# Patient Record
Sex: FEMALE | Race: WHITE | Employment: OTHER | URBAN - METROPOLITAN AREA
[De-identification: names, ages, dates, MRNs, and addresses within clinical notes are randomized per-mention and may not be internally consistent; named-entity substitution may affect disease eponyms.]

---

## 2019-02-23 ENCOUNTER — HOSPITAL ENCOUNTER (EMERGENCY)
Age: 59
Discharge: HOME | End: 2019-02-23
Payer: COMMERCIAL

## 2019-02-23 ENCOUNTER — APPOINTMENT (OUTPATIENT)
Dept: GENERAL RADIOLOGY | Age: 59
End: 2019-02-23
Payer: COMMERCIAL

## 2019-02-23 VITALS
HEART RATE: 75 BPM | OXYGEN SATURATION: 98 % | BODY MASS INDEX: 26.66 KG/M2 | SYSTOLIC BLOOD PRESSURE: 183 MMHG | DIASTOLIC BLOOD PRESSURE: 105 MMHG | HEIGHT: 65 IN | WEIGHT: 160 LBS | TEMPERATURE: 98.6 F | RESPIRATION RATE: 18 BRPM

## 2019-02-23 DIAGNOSIS — S86.911A STRAIN OF RIGHT KNEE, INITIAL ENCOUNTER: Primary | ICD-10-CM

## 2019-02-23 PROCEDURE — 99283 EMERGENCY DEPT VISIT LOW MDM: CPT

## 2019-02-23 PROCEDURE — 6370000000 HC RX 637 (ALT 250 FOR IP): Performed by: PHYSICIAN ASSISTANT

## 2019-02-23 PROCEDURE — 73562 X-RAY EXAM OF KNEE 3: CPT

## 2019-02-23 RX ORDER — NAPROXEN 500 MG/1
500 TABLET ORAL 2 TIMES DAILY
Qty: 14 TABLET | Refills: 0 | Status: SHIPPED | OUTPATIENT
Start: 2019-02-23 | End: 2020-10-02

## 2019-02-23 RX ORDER — IBUPROFEN 600 MG/1
600 TABLET ORAL ONCE
Status: COMPLETED | OUTPATIENT
Start: 2019-02-23 | End: 2019-02-23

## 2019-02-23 RX ADMIN — IBUPROFEN 600 MG: 600 TABLET ORAL at 14:36

## 2019-02-23 ASSESSMENT — PAIN DESCRIPTION - DESCRIPTORS: DESCRIPTORS: ACHING

## 2019-02-23 ASSESSMENT — PAIN SCALES - GENERAL
PAINLEVEL_OUTOF10: 5
PAINLEVEL_OUTOF10: 5

## 2019-02-23 ASSESSMENT — PAIN DESCRIPTION - LOCATION: LOCATION: KNEE

## 2019-02-23 ASSESSMENT — PAIN DESCRIPTION - PAIN TYPE: TYPE: ACUTE PAIN

## 2019-02-23 ASSESSMENT — PAIN DESCRIPTION - ORIENTATION: ORIENTATION: RIGHT

## 2019-02-23 ASSESSMENT — PAIN - FUNCTIONAL ASSESSMENT: PAIN_FUNCTIONAL_ASSESSMENT: PREVENTS OR INTERFERES WITH MANY ACTIVE NOT PASSIVE ACTIVITIES

## 2019-03-08 LAB
ALBUMIN SERPL-MCNC: NORMAL G/DL
ALP BLD-CCNC: NORMAL U/L
ALT SERPL-CCNC: NORMAL U/L
ANION GAP SERPL CALCULATED.3IONS-SCNC: NORMAL MMOL/L
AST SERPL-CCNC: NORMAL U/L
BASOPHILS ABSOLUTE: NORMAL
BASOPHILS RELATIVE PERCENT: NORMAL
BILIRUB SERPL-MCNC: NORMAL MG/DL
BUN BLDV-MCNC: NORMAL MG/DL
CALCIUM SERPL-MCNC: NORMAL MG/DL
CHLORIDE BLD-SCNC: NORMAL MMOL/L
CHOLESTEROL, TOTAL: NORMAL
CHOLESTEROL/HDL RATIO: NORMAL
CO2: NORMAL
CREAT SERPL-MCNC: NORMAL MG/DL
EOSINOPHILS ABSOLUTE: NORMAL
EOSINOPHILS RELATIVE PERCENT: NORMAL
GFR CALCULATED: NORMAL
GLUCOSE BLD-MCNC: NORMAL MG/DL
HCT VFR BLD CALC: NORMAL %
HDLC SERPL-MCNC: NORMAL MG/DL
HEMOGLOBIN: NORMAL
LDL CHOLESTEROL CALCULATED: NORMAL
LYMPHOCYTES ABSOLUTE: NORMAL
LYMPHOCYTES RELATIVE PERCENT: NORMAL
MCH RBC QN AUTO: NORMAL PG
MCHC RBC AUTO-ENTMCNC: NORMAL G/DL
MCV RBC AUTO: NORMAL FL
MONOCYTES ABSOLUTE: NORMAL
MONOCYTES RELATIVE PERCENT: NORMAL
NEUTROPHILS ABSOLUTE: NORMAL
NEUTROPHILS RELATIVE PERCENT: NORMAL
PLATELET # BLD: NORMAL 10*3/UL
PMV BLD AUTO: NORMAL FL
POTASSIUM SERPL-SCNC: NORMAL MMOL/L
RBC # BLD: NORMAL 10*6/UL
SODIUM BLD-SCNC: NORMAL MMOL/L
TOTAL PROTEIN: NORMAL
TRIGL SERPL-MCNC: NORMAL MG/DL
TSH SERPL DL<=0.05 MIU/L-ACNC: NORMAL M[IU]/L
VITAMIN D 25-HYDROXY: NORMAL
VITAMIN D2, 25 HYDROXY: NORMAL
VITAMIN D3,25 HYDROXY: NORMAL
VLDLC SERPL CALC-MCNC: NORMAL MG/DL
WBC # BLD: NORMAL 10*3/UL

## 2020-01-09 LAB
BILIRUBIN, URINE: NORMAL
BLOOD, URINE: NORMAL
CLARITY: NORMAL
COLOR: NORMAL
GLUCOSE URINE: NORMAL
KETONES, URINE: NORMAL
LEUKOCYTE ESTERASE, URINE: NORMAL
NITRITE, URINE: NORMAL
PH UA: NORMAL
PROTEIN UA: NORMAL
SPECIFIC GRAVITY, URINE: NORMAL
UROBILINOGEN, URINE: NORMAL

## 2020-07-09 RX ORDER — LEVOTHYROXINE SODIUM 137 UG/1
137 TABLET ORAL DAILY
Qty: 30 TABLET | Refills: 5 | Status: SHIPPED
Start: 2020-07-09 | End: 2021-01-11 | Stop reason: SDUPTHER

## 2020-08-10 VITALS
WEIGHT: 157 LBS | BODY MASS INDEX: 25.23 KG/M2 | SYSTOLIC BLOOD PRESSURE: 134 MMHG | HEIGHT: 66 IN | DIASTOLIC BLOOD PRESSURE: 77 MMHG

## 2020-08-10 RX ORDER — METOPROLOL SUCCINATE 25 MG/1
TABLET, EXTENDED RELEASE ORAL
COMMUNITY
Start: 2017-02-11 | End: 2020-10-02

## 2020-08-10 RX ORDER — NEOMYCIN SULFATE, POLYMYXIN B SULFATE AND HYDROCORTISONE 10; 3.5; 1 MG/ML; MG/ML; [USP'U]/ML
3 SUSPENSION/ DROPS AURICULAR (OTIC) 3 TIMES DAILY
COMMUNITY
End: 2020-10-02

## 2020-08-10 RX ORDER — CIPROFLOXACIN 250 MG/1
250 TABLET, FILM COATED ORAL 2 TIMES DAILY
COMMUNITY
End: 2020-10-02

## 2020-08-10 RX ORDER — IBUPROFEN 800 MG/1
TABLET ORAL
COMMUNITY
End: 2022-08-08

## 2020-09-16 ENCOUNTER — NURSE ONLY (OUTPATIENT)
Dept: PRIMARY CARE CLINIC | Age: 60
End: 2020-09-16
Payer: COMMERCIAL

## 2020-09-16 ENCOUNTER — HOSPITAL ENCOUNTER (OUTPATIENT)
Age: 60
Discharge: HOME | End: 2020-09-18
Payer: COMMERCIAL

## 2020-09-16 LAB
BILIRUBIN, POC: NORMAL
BLOOD URINE, POC: NORMAL
CLARITY, POC: CLEAR
COLOR, POC: YELLOW
GLUCOSE URINE, POC: NORMAL
KETONES, POC: NORMAL
LEUKOCYTE EST, POC: NORMAL
NITRITE, POC: NORMAL
PH, POC: 7
PROTEIN, POC: NORMAL
SPECIFIC GRAVITY, POC: 1.02
UROBILINOGEN, POC: 0.2

## 2020-09-16 PROCEDURE — 87088 URINE BACTERIA CULTURE: CPT

## 2020-09-16 PROCEDURE — 81002 URINALYSIS NONAUTO W/O SCOPE: CPT | Performed by: INTERNAL MEDICINE

## 2020-09-16 RX ORDER — CIPROFLOXACIN 250 MG/1
250 TABLET, FILM COATED ORAL 2 TIMES DAILY
Qty: 10 TABLET | Refills: 0 | Status: SHIPPED | OUTPATIENT
Start: 2020-09-16 | End: 2020-09-21

## 2020-09-18 LAB — URINE CULTURE, ROUTINE: NORMAL

## 2020-10-01 PROBLEM — I10 ESSENTIAL HYPERTENSION: Status: ACTIVE | Noted: 2020-10-01

## 2020-10-02 ENCOUNTER — OFFICE VISIT (OUTPATIENT)
Dept: PRIMARY CARE CLINIC | Age: 60
End: 2020-10-02
Payer: COMMERCIAL

## 2020-10-02 ENCOUNTER — HOSPITAL ENCOUNTER (OUTPATIENT)
Age: 60
Discharge: HOME | End: 2020-10-04
Payer: COMMERCIAL

## 2020-10-02 VITALS
OXYGEN SATURATION: 96 % | RESPIRATION RATE: 18 BRPM | WEIGHT: 162 LBS | DIASTOLIC BLOOD PRESSURE: 80 MMHG | SYSTOLIC BLOOD PRESSURE: 118 MMHG | HEIGHT: 66 IN | TEMPERATURE: 97.3 F | HEART RATE: 86 BPM | BODY MASS INDEX: 26.03 KG/M2

## 2020-10-02 LAB
BILIRUBIN, POC: NORMAL
BLOOD URINE, POC: NORMAL
CLARITY, POC: CLEAR
COLOR, POC: YELLOW
GLUCOSE URINE, POC: NORMAL
KETONES, POC: NORMAL
LEUKOCYTE EST, POC: NORMAL
NITRITE, POC: NORMAL
PH, POC: 6.5
PROTEIN, POC: NORMAL
SPECIFIC GRAVITY, POC: 1.02
UROBILINOGEN, POC: 1

## 2020-10-02 PROCEDURE — 99213 OFFICE O/P EST LOW 20 MIN: CPT | Performed by: INTERNAL MEDICINE

## 2020-10-02 PROCEDURE — 87088 URINE BACTERIA CULTURE: CPT

## 2020-10-02 PROCEDURE — 81002 URINALYSIS NONAUTO W/O SCOPE: CPT | Performed by: INTERNAL MEDICINE

## 2020-10-02 RX ORDER — PHENAZOPYRIDINE HYDROCHLORIDE 100 MG/1
100 TABLET, FILM COATED ORAL 3 TIMES DAILY PRN
Qty: 20 TABLET | Refills: 0 | Status: SHIPPED | OUTPATIENT
Start: 2020-10-02 | End: 2020-10-09

## 2020-10-02 ASSESSMENT — PATIENT HEALTH QUESTIONNAIRE - PHQ9
1. LITTLE INTEREST OR PLEASURE IN DOING THINGS: 0
SUM OF ALL RESPONSES TO PHQ9 QUESTIONS 1 & 2: 0
SUM OF ALL RESPONSES TO PHQ QUESTIONS 1-9: 0
2. FEELING DOWN, DEPRESSED OR HOPELESS: 0
SUM OF ALL RESPONSES TO PHQ QUESTIONS 1-9: 0

## 2020-10-04 LAB — URINE CULTURE, ROUTINE: NORMAL

## 2020-12-03 ENCOUNTER — TELEPHONE (OUTPATIENT)
Dept: PRIMARY CARE CLINIC | Age: 60
End: 2020-12-03

## 2020-12-03 NOTE — TELEPHONE ENCOUNTER
Pt phoned stating she just fell 45 min ago at [employer] . She states her knee is big red swollen with a bubble on it . Please advise ?

## 2021-01-11 RX ORDER — LEVOTHYROXINE SODIUM 137 UG/1
137 TABLET ORAL DAILY
Qty: 90 TABLET | Refills: 3 | Status: SHIPPED
Start: 2021-01-11 | End: 2022-01-19 | Stop reason: SDUPTHER

## 2021-02-23 ENCOUNTER — TELEPHONE (OUTPATIENT)
Dept: ADMINISTRATIVE | Age: 61
End: 2021-02-23

## 2021-02-23 ENCOUNTER — OFFICE VISIT (OUTPATIENT)
Dept: PRIMARY CARE CLINIC | Age: 61
End: 2021-02-23

## 2021-02-23 VITALS
HEIGHT: 65 IN | WEIGHT: 166 LBS | RESPIRATION RATE: 16 BRPM | DIASTOLIC BLOOD PRESSURE: 82 MMHG | OXYGEN SATURATION: 96 % | SYSTOLIC BLOOD PRESSURE: 128 MMHG | HEART RATE: 85 BPM | BODY MASS INDEX: 27.66 KG/M2 | TEMPERATURE: 97.2 F

## 2021-02-23 DIAGNOSIS — H66.91 ACUTE RIGHT OTITIS MEDIA: ICD-10-CM

## 2021-02-23 DIAGNOSIS — J01.00 ACUTE MAXILLARY SINUSITIS, RECURRENCE NOT SPECIFIED: Primary | ICD-10-CM

## 2021-02-23 PROCEDURE — G8484 FLU IMMUNIZE NO ADMIN: HCPCS | Performed by: INTERNAL MEDICINE

## 2021-02-23 PROCEDURE — G8419 CALC BMI OUT NRM PARAM NOF/U: HCPCS | Performed by: INTERNAL MEDICINE

## 2021-02-23 PROCEDURE — 1036F TOBACCO NON-USER: CPT | Performed by: INTERNAL MEDICINE

## 2021-02-23 PROCEDURE — G8427 DOCREV CUR MEDS BY ELIG CLIN: HCPCS | Performed by: INTERNAL MEDICINE

## 2021-02-23 PROCEDURE — 99213 OFFICE O/P EST LOW 20 MIN: CPT | Performed by: INTERNAL MEDICINE

## 2021-02-23 PROCEDURE — 3017F COLORECTAL CA SCREEN DOC REV: CPT | Performed by: INTERNAL MEDICINE

## 2021-02-23 RX ORDER — DEXAMETHASONE 2 MG/1
2 TABLET ORAL 2 TIMES DAILY WITH MEALS
Qty: 6 TABLET | Refills: 0 | Status: SHIPPED | OUTPATIENT
Start: 2021-02-23 | End: 2021-02-26

## 2021-02-23 RX ORDER — AMOXICILLIN AND CLAVULANATE POTASSIUM 875; 125 MG/1; MG/1
1 TABLET, FILM COATED ORAL 2 TIMES DAILY
Qty: 20 TABLET | Refills: 0 | Status: SHIPPED | OUTPATIENT
Start: 2021-02-23 | End: 2021-03-05

## 2021-02-23 SDOH — ECONOMIC STABILITY: FOOD INSECURITY: WITHIN THE PAST 12 MONTHS, YOU WORRIED THAT YOUR FOOD WOULD RUN OUT BEFORE YOU GOT MONEY TO BUY MORE.: NEVER TRUE

## 2021-02-23 SDOH — ECONOMIC STABILITY: FOOD INSECURITY: WITHIN THE PAST 12 MONTHS, THE FOOD YOU BOUGHT JUST DIDN'T LAST AND YOU DIDN'T HAVE MONEY TO GET MORE.: NEVER TRUE

## 2021-02-23 ASSESSMENT — PATIENT HEALTH QUESTIONNAIRE - PHQ9: SUM OF ALL RESPONSES TO PHQ9 QUESTIONS 1 & 2: 0

## 2021-02-23 NOTE — TELEPHONE ENCOUNTER
Patient called in today to be seen for ear pain, offered next avail in [facility], also offered Richard with referral but patient reports she would like to be seen with in the next 2 days. Patient states she is insure what to do advised to reach out to PCP to see if there is anything they can do at the moment. Patient would like to be seen asap.  Best call back for patient is [phone]

## 2021-02-23 NOTE — PROGRESS NOTES
Marie Duffy  2/23/21     Chief Complaint   Patient presents with    Otalgia     Right ear pain x 3 days. Radiating pain to jaw and eye. Allergies   Allergen Reactions    Promethazine Hcl Swelling     Other reaction(s): Intolerance  Tongue swelling, palpitations    Promethazine Palpitations and Swelling        Current Outpatient Medications   Medication Sig Dispense Refill    amoxicillin-clavulanate (AUGMENTIN) 875-125 MG per tablet Take 1 tablet by mouth 2 times daily for 10 days 20 tablet 0    dexamethasone (DECADRON) 2 MG tablet Take 1 tablet by mouth 2 times daily (with meals) for 3 days 6 tablet 0    levothyroxine (SYNTHROID) 137 MCG tablet Take 1 tablet by mouth Daily 90 tablet 3    ibuprofen (ADVIL;MOTRIN) 800 MG tablet prn      metoprolol (LOPRESSOR) 25 MG tablet Take 25 mg by mouth Daily with lunch       omeprazole (PRILOSEC) 10 MG capsule Take 10 mg by mouth daily       No current facility-administered medications for this visit. HPI: Patient comes in complaining of persistent pain in her right ear and right side of her face and maxillary area and also down into her right submandibular area for the past several days. She states her right upper teeth hurt at times. She denies any fever or chills. She has an appointment next month to see an ENT specialist.    Review of Systems: as per HPI      Physical Exam:    Patient is a 61 y.o. female. Patient appears to be in no distress. Breathing comfortably. Ambulates without assistance. HEENT: Right TM somewhat inflamed. Neck supple, no adenopathy or bruits. There is tenderness over the right submandibular area with no lymphadenopathy noted. There is mild discomfort to percussion over the right maxillary sinus. Heart RR, no MGR. Lungs clear. Abd: normal  Ext: no edema. Peripheral pulses: normal.  No neurologic deficits noted.     Lab Results   Component Value Date    WBC 3.0 (L) 03/08/2018    HGB 14.8 03/08/2018 HCT 44.8 03/08/2018     03/08/2018    CHOL 162 03/08/2018    TRIG 68 03/08/2018    HDL 51 03/08/2018    ALT 16 03/08/2018    AST 17 03/08/2018    TSH 0.008 (L) 03/08/2018      Lab Results   Component Value Date     (H) 03/08/2018    K 4.5 03/08/2018     03/08/2018    CO2 25 03/08/2018    BUN 13 03/08/2018    CREATININE 0.8 03/08/2018    GLUCOSE 90 03/08/2018    CALCIUM 9.4 03/08/2018    PROT 7.5 03/08/2018    LABALBU 4.4 03/08/2018    BILITOT 0.5 03/08/2018    ALKPHOS 95 03/08/2018    AST 17 03/08/2018    ALT 16 03/08/2018    LABGLOM >60 03/08/2018    GFRAA >60 03/08/2018            Assessment:    Raefordlatrell Arambula was seen today for otalgia. Diagnoses and all orders for this visit:    Acute maxillary sinusitis, recurrence not specified  -     amoxicillin-clavulanate (AUGMENTIN) 875-125 MG per tablet; Take 1 tablet by mouth 2 times daily for 10 days  -     dexamethasone (DECADRON) 2 MG tablet; Take 1 tablet by mouth 2 times daily (with meals) for 3 days    Acute right otitis media  -     amoxicillin-clavulanate (AUGMENTIN) 875-125 MG per tablet; Take 1 tablet by mouth 2 times daily for 10 days          Discussion Notes: We will start patient on Augmentin 875 mg twice daily x10 days and Decadron 2 mg twice daily x3 days. She will call in a few days if her symptoms are not improving and she will follow-up as planned with ENT next month.   I suspect she will need a CAT scan of the sinuses and will consider ordering that before her ENT visit if her symptoms persist.

## 2021-02-25 ENCOUNTER — TELEPHONE (OUTPATIENT)
Dept: PRIMARY CARE CLINIC | Age: 61
End: 2021-02-25

## 2021-02-25 DIAGNOSIS — H92.01 RIGHT EAR PAIN: ICD-10-CM

## 2021-02-25 DIAGNOSIS — R51.9 PRESSURE AND PAIN OF RIGHT SIDE OF FACE: Primary | ICD-10-CM

## 2021-02-25 NOTE — TELEPHONE ENCOUNTER
Pt calling she would now  like ct scan for eval she is feeling worse, right side of her face is swollen,ear pain,headache.

## 2021-03-08 ENCOUNTER — HOSPITAL ENCOUNTER (OUTPATIENT)
Dept: CT IMAGING | Age: 61
Discharge: HOME | End: 2021-03-10
Payer: COMMERCIAL

## 2021-03-08 DIAGNOSIS — H92.01 RIGHT EAR PAIN: ICD-10-CM

## 2021-03-08 DIAGNOSIS — R51.9 PRESSURE AND PAIN OF RIGHT SIDE OF FACE: ICD-10-CM

## 2021-03-08 PROCEDURE — 70486 CT MAXILLOFACIAL W/O DYE: CPT

## 2021-03-30 ENCOUNTER — TELEPHONE (OUTPATIENT)
Dept: PRIMARY CARE CLINIC | Age: 61
End: 2021-03-30

## 2021-03-30 NOTE — TELEPHONE ENCOUNTER
Pt calling she had ct scan done and it was denied because it had ear pain as the code she said it was for sinus pressure and headaches. She went to ENT she was advised to follow up with you for migraines, he treated the TMJ issue.

## 2021-04-15 ENCOUNTER — OFFICE VISIT (OUTPATIENT)
Dept: PRIMARY CARE CLINIC | Age: 61
End: 2021-04-15
Payer: COMMERCIAL

## 2021-04-15 VITALS
OXYGEN SATURATION: 98 % | HEART RATE: 68 BPM | RESPIRATION RATE: 18 BRPM | BODY MASS INDEX: 27.82 KG/M2 | WEIGHT: 167 LBS | HEIGHT: 65 IN | DIASTOLIC BLOOD PRESSURE: 78 MMHG | SYSTOLIC BLOOD PRESSURE: 138 MMHG | TEMPERATURE: 97.3 F

## 2021-04-15 DIAGNOSIS — G43.909 MIGRAINE WITHOUT STATUS MIGRAINOSUS, NOT INTRACTABLE, UNSPECIFIED MIGRAINE TYPE: Primary | ICD-10-CM

## 2021-04-15 DIAGNOSIS — M47.22 OSTEOARTHRITIS OF SPINE WITH RADICULOPATHY, CERVICAL REGION: ICD-10-CM

## 2021-04-15 DIAGNOSIS — M26.629 TMJ SYNDROME: ICD-10-CM

## 2021-04-15 PROCEDURE — G8427 DOCREV CUR MEDS BY ELIG CLIN: HCPCS | Performed by: INTERNAL MEDICINE

## 2021-04-15 PROCEDURE — 99213 OFFICE O/P EST LOW 20 MIN: CPT | Performed by: INTERNAL MEDICINE

## 2021-04-15 PROCEDURE — 1036F TOBACCO NON-USER: CPT | Performed by: INTERNAL MEDICINE

## 2021-04-15 PROCEDURE — G8419 CALC BMI OUT NRM PARAM NOF/U: HCPCS | Performed by: INTERNAL MEDICINE

## 2021-04-15 PROCEDURE — 3017F COLORECTAL CA SCREEN DOC REV: CPT | Performed by: INTERNAL MEDICINE

## 2021-04-15 RX ORDER — RIZATRIPTAN BENZOATE 10 MG/1
10 TABLET, ORALLY DISINTEGRATING ORAL
Qty: 30 TABLET | Refills: 3 | Status: SHIPPED | OUTPATIENT
Start: 2021-04-15 | End: 2022-08-08

## 2021-04-15 NOTE — PROGRESS NOTES
Jeannette Soler  4/15/21     Chief Complaint   Patient presents with    Migraine     pt states she has pain in her TMJ. Migraine zully been off and on for years. Allergies   Allergen Reactions    Promethazine Hcl Swelling     Other reaction(s): Intolerance  Tongue swelling, palpitations    Promethazine Palpitations and Swelling        Current Outpatient Medications   Medication Sig Dispense Refill    rizatriptan (MAXALT-MLT) 10 MG disintegrating tablet Take 1 tablet by mouth once as needed for Migraine May repeat in 2 hours if needed 30 tablet 3    metoprolol tartrate (LOPRESSOR) 25 MG tablet Take 1 tablet by mouth Daily with lunch 90 tablet 3    levothyroxine (SYNTHROID) 137 MCG tablet Take 1 tablet by mouth Daily 90 tablet 3    ibuprofen (ADVIL;MOTRIN) 800 MG tablet prn      omeprazole (PRILOSEC) 10 MG capsule Take 10 mg by mouth daily       No current facility-administered medications for this visit. HPI: Returns for follow-up visit. Since she was here last she had a CAT scan of her sinuses did not reveal any evidence of acute sinusitis but it did show evidence of osteoarthritis involving her TMJs bilaterally. Her pain is on the right side. She has various types of headaches including cervical occipital, right frontal and orbital, associated with occasional drooping of her right eyelid. 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 Also she states that several years ago she was tried on Maxalt and did help some of her symptoms. She would like to try that again. Review of Systems: as per HPI      Physical Exam:    Patient is a 61 y.o. female. Patient appears to be in no distress. Breathing comfortably. Ambulates without assistance. HEENT: normal.  Neck supple, no adenopathy or bruits. Heart RR, no MGR. Lungs clear. Assessment:    Corrinne Seton was seen today for migraine.     Diagnoses and all orders for this visit:    Migraine without status migrainosus, not intractable, unspecified migraine type  -     rizatriptan (MAXALT-MLT) 10 MG disintegrating tablet; Take 1 tablet by mouth once as needed for Migraine May repeat in 2 hours if needed    TMJ syndrome    Osteoarthritis of spine with radiculopathy, cervical region          Discussion Notes: Patient to continue all her usual meds and supplements the same as listed on her med list.  We will try her on Maxalt MLT 10 mg as needed for migraines and she will let us know whether or not that is effective. Also she is going to try a bite guard for her TMJ and hopefully that will also help her headaches.

## 2021-05-19 DIAGNOSIS — I10 ESSENTIAL HYPERTENSION: Primary | ICD-10-CM

## 2021-06-10 ENCOUNTER — OFFICE VISIT (OUTPATIENT)
Dept: PRIMARY CARE CLINIC | Age: 61
End: 2021-06-10
Payer: COMMERCIAL

## 2021-06-10 VITALS
BODY MASS INDEX: 26.36 KG/M2 | HEIGHT: 66 IN | OXYGEN SATURATION: 97 % | SYSTOLIC BLOOD PRESSURE: 120 MMHG | RESPIRATION RATE: 18 BRPM | WEIGHT: 164 LBS | TEMPERATURE: 97.7 F | HEART RATE: 81 BPM | DIASTOLIC BLOOD PRESSURE: 88 MMHG

## 2021-06-10 DIAGNOSIS — E03.9 ACQUIRED HYPOTHYROIDISM: ICD-10-CM

## 2021-06-10 DIAGNOSIS — E55.9 VITAMIN D DEFICIENCY: ICD-10-CM

## 2021-06-10 DIAGNOSIS — M26.629 TMJ SYNDROME: ICD-10-CM

## 2021-06-10 DIAGNOSIS — M47.22 OSTEOARTHRITIS OF SPINE WITH RADICULOPATHY, CERVICAL REGION: ICD-10-CM

## 2021-06-10 DIAGNOSIS — R03.0 BORDERLINE HYPERTENSION: ICD-10-CM

## 2021-06-10 DIAGNOSIS — M54.81 CERVICO-OCCIPITAL NEURALGIA OF RIGHT SIDE: ICD-10-CM

## 2021-06-10 DIAGNOSIS — E78.00 PURE HYPERCHOLESTEROLEMIA: ICD-10-CM

## 2021-06-10 DIAGNOSIS — G43.109 OCULAR MIGRAINE: ICD-10-CM

## 2021-06-10 DIAGNOSIS — I10 ESSENTIAL HYPERTENSION: ICD-10-CM

## 2021-06-10 DIAGNOSIS — M47.812 SPONDYLOSIS OF CERVICAL REGION WITHOUT MYELOPATHY OR RADICULOPATHY: Primary | ICD-10-CM

## 2021-06-10 DIAGNOSIS — R53.83 FATIGUE, UNSPECIFIED TYPE: ICD-10-CM

## 2021-06-10 DIAGNOSIS — Z86.69 HISTORY OF MIGRAINE HEADACHES: ICD-10-CM

## 2021-06-10 PROCEDURE — 3017F COLORECTAL CA SCREEN DOC REV: CPT | Performed by: INTERNAL MEDICINE

## 2021-06-10 PROCEDURE — 1036F TOBACCO NON-USER: CPT | Performed by: INTERNAL MEDICINE

## 2021-06-10 PROCEDURE — G8419 CALC BMI OUT NRM PARAM NOF/U: HCPCS | Performed by: INTERNAL MEDICINE

## 2021-06-10 PROCEDURE — 99214 OFFICE O/P EST MOD 30 MIN: CPT | Performed by: INTERNAL MEDICINE

## 2021-06-10 PROCEDURE — G8427 DOCREV CUR MEDS BY ELIG CLIN: HCPCS | Performed by: INTERNAL MEDICINE

## 2021-06-10 NOTE — PROGRESS NOTES
Neri Diaz  6/10/21     Chief Complaint   Patient presents with    Neck Pain        Allergies   Allergen Reactions    Promethazine Hcl Swelling     Other reaction(s): Intolerance  Tongue swelling, palpitations    Promethazine Palpitations and Swelling        Current Outpatient Medications   Medication Sig Dispense Refill    metoprolol tartrate (LOPRESSOR) 25 MG tablet Take 1 tablet by mouth Daily with lunch 90 tablet 3    levothyroxine (SYNTHROID) 137 MCG tablet Take 1 tablet by mouth Daily 90 tablet 3    ibuprofen (ADVIL;MOTRIN) 800 MG tablet prn      omeprazole (PRILOSEC) 10 MG capsule Take 10 mg by mouth daily      rizatriptan (MAXALT-MLT) 10 MG disintegrating tablet Take 1 tablet by mouth once as needed for Migraine May repeat in 2 hours if needed 30 tablet 3     No current facility-administered medications for this visit. HPI: Comes in for follow-up visit. She mainly complains of persistent neck pain and stiffness and pain along the occipital ridge. She also complains of episodic headaches in the right frontal and orbital area. She takes Maxalt MLT for occasional migraines. She remains on her other meds the same as listed on her med list, which was reviewed with her. She also takes ibuprofen 800 mg as needed for pain. She was having some visual symptoms mainly some flashing lights and horizontal lines in her left field of vision and she saw her ophthalmologist yesterday. She complains of vague swelling around her right eye and side of her face, which has been ongoing for past few years. 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 She had an extensive evaluation several years ago. She felt her symptoms were most likely due to ocular migraines. She denies any chest pain or shortness of breath. He still has problems with right-sided TMJ. Review of Systems: as per HPI      Physical Exam:    Patient is a 61 y.o. female.   Patient appears to be in no distress. She is alert and oriented. Breathing comfortably. Ambulates without assistance. HEENT: normal.  Neck supple, no adenopathy or bruits. Range of motion of her cervical spine is somewhat limited. Heart RR, no MGR. Lungs clear. Abd: normal  Ext: no edema. Peripheral pulses: normal.  No neurologic deficits noted. Assessment:    Laisha Lynch was seen today for neck pain. Diagnoses and all orders for this visit:    Spondylosis of cervical region without myelopathy or radiculopathy    Ocular migraine    Essential hypertension    Cervico-occipital neuralgia of right side    TMJ syndrome    Osteoarthritis of spine with radiculopathy, cervical region    Acquired hypothyroidism    History of migraine headaches          Discussion Notes: She will continue her usual meds and supplements the same as listed on her med list.  She will try to do some range of motion exercises for her cervical spine. Suggested various options for treatment of her chronic neck pain including possible chiropractic treatment, physical therapy, or pain management. She is due for a complete physical and we will schedule her for that with labs within the next few weeks.

## 2021-06-11 PROBLEM — Z86.69 HISTORY OF MIGRAINE HEADACHES: Status: ACTIVE | Noted: 2021-06-11

## 2021-06-11 PROBLEM — G43.109 OCULAR MIGRAINE: Status: ACTIVE | Noted: 2021-06-11

## 2021-06-28 DIAGNOSIS — I10 ESSENTIAL HYPERTENSION: ICD-10-CM

## 2021-07-12 ENCOUNTER — OFFICE VISIT (OUTPATIENT)
Dept: PRIMARY CARE CLINIC | Age: 61
End: 2021-07-12
Payer: COMMERCIAL

## 2021-07-12 VITALS
WEIGHT: 166 LBS | HEIGHT: 65 IN | OXYGEN SATURATION: 99 % | HEART RATE: 69 BPM | RESPIRATION RATE: 18 BRPM | BODY MASS INDEX: 27.66 KG/M2 | SYSTOLIC BLOOD PRESSURE: 120 MMHG | DIASTOLIC BLOOD PRESSURE: 70 MMHG | TEMPERATURE: 97.3 F

## 2021-07-12 DIAGNOSIS — N39.0 RECURRENT UTI: ICD-10-CM

## 2021-07-12 DIAGNOSIS — N39.0 URINARY TRACT INFECTION WITHOUT HEMATURIA, SITE UNSPECIFIED: Primary | ICD-10-CM

## 2021-07-12 LAB
BILIRUBIN, POC: NORMAL
BLOOD URINE, POC: 5.5
CLARITY, POC: CLEAR
COLOR, POC: YELLOW
GLUCOSE URINE, POC: NORMAL
KETONES, POC: 1.02
LEUKOCYTE EST, POC: NORMAL
NITRITE, POC: POSITIVE
PH, POC: NORMAL
PROTEIN, POC: 2
SPECIFIC GRAVITY, POC: NORMAL
UROBILINOGEN, POC: NORMAL

## 2021-07-12 PROCEDURE — 81002 URINALYSIS NONAUTO W/O SCOPE: CPT | Performed by: INTERNAL MEDICINE

## 2021-07-12 PROCEDURE — 1036F TOBACCO NON-USER: CPT | Performed by: INTERNAL MEDICINE

## 2021-07-12 PROCEDURE — 99213 OFFICE O/P EST LOW 20 MIN: CPT | Performed by: INTERNAL MEDICINE

## 2021-07-12 PROCEDURE — G8427 DOCREV CUR MEDS BY ELIG CLIN: HCPCS | Performed by: INTERNAL MEDICINE

## 2021-07-12 PROCEDURE — 3017F COLORECTAL CA SCREEN DOC REV: CPT | Performed by: INTERNAL MEDICINE

## 2021-07-12 PROCEDURE — G8419 CALC BMI OUT NRM PARAM NOF/U: HCPCS | Performed by: INTERNAL MEDICINE

## 2021-07-12 RX ORDER — CIPROFLOXACIN 250 MG/1
250 TABLET, FILM COATED ORAL 2 TIMES DAILY
Qty: 14 TABLET | Refills: 0 | Status: SHIPPED | OUTPATIENT
Start: 2021-07-12 | End: 2021-07-19

## 2021-07-12 NOTE — PROGRESS NOTES
Diann Zendejas  7/12/21     Chief Complaint   Patient presents with    Urinary Frequency        Allergies   Allergen Reactions    Promethazine Hcl Swelling     Other reaction(s): Intolerance  Tongue swelling, palpitations    Promethazine Palpitations and Swelling        Current Outpatient Medications   Medication Sig Dispense Refill    ciprofloxacin (CIPRO) 250 MG tablet Take 1 tablet by mouth 2 times daily for 7 days 14 tablet 0    metoprolol tartrate (LOPRESSOR) 25 MG tablet Take 1 tablet by mouth Daily with lunch 90 tablet 3    levothyroxine (SYNTHROID) 137 MCG tablet Take 1 tablet by mouth Daily 90 tablet 3    ibuprofen (ADVIL;MOTRIN) 800 MG tablet prn      omeprazole (PRILOSEC) 10 MG capsule Take 10 mg by mouth daily      rizatriptan (MAXALT-MLT) 10 MG disintegrating tablet Take 1 tablet by mouth once as needed for Migraine May repeat in 2 hours if needed 30 tablet 3     No current facility-administered medications for this visit. HPI: Comes in complaining of urinary frequency and some dysuria and lower back pain for the past few days. She denies any fever or chills. She has had problems with urinary tract infections in the past.  Also she states that she has urinary retention and sometimes it takes her quite a while to empty her bladder. She did see a urologist several years ago. She was on an anticholinergic for a period of time but stopped taking it because she was doing better and it was causing some dry mouth. Review of Systems: as per HPI      Physical Exam:    Patient is a 61 y.o. female. Patient appears to be in no distress. Breathing comfortably. Ambulates without assistance. HEENT: normal.  Neck supple, no adenopathy or bruits. Heart RR, no MGR. Lungs clear. Abd: normal  Ext: no edema. Peripheral pulses: normal.  No neurologic deficits noted. Assessment:    Jeannette Ramírez was seen today for urinary frequency.     Diagnoses and all orders for this visit:    Urinary tract infection without hematuria, site unspecified  -     ciprofloxacin (CIPRO) 250 MG tablet; Take 1 tablet by mouth 2 times daily for 7 days    Frequency of urination  -     POCT Urinalysis no Micro  -     Culture, Urine    Recurrent UTI  -     External Referral To Urology          Discussion Notes: We will send urine for C&S and start her empirically on Cipro 250 mg twice daily x7 days. Will make further recommendations depending on results of her urine culture. Also will refer her to urology for further evaluation of her urinary retention and recurrent UTIs.

## 2021-07-15 LAB — URINE CULTURE, ROUTINE: NORMAL

## 2022-01-19 RX ORDER — LEVOTHYROXINE SODIUM 137 UG/1
137 TABLET ORAL DAILY
Qty: 90 TABLET | Refills: 3 | Status: SHIPPED | OUTPATIENT
Start: 2022-01-19

## 2022-02-10 ENCOUNTER — OFFICE VISIT (OUTPATIENT)
Dept: PRIMARY CARE CLINIC | Age: 62
End: 2022-02-10
Payer: COMMERCIAL

## 2022-02-10 VITALS
SYSTOLIC BLOOD PRESSURE: 135 MMHG | RESPIRATION RATE: 18 BRPM | TEMPERATURE: 97.3 F | HEART RATE: 93 BPM | WEIGHT: 168 LBS | DIASTOLIC BLOOD PRESSURE: 95 MMHG | BODY MASS INDEX: 27.99 KG/M2 | OXYGEN SATURATION: 97 % | HEIGHT: 65 IN

## 2022-02-10 DIAGNOSIS — K21.9 GASTROESOPHAGEAL REFLUX DISEASE WITHOUT ESOPHAGITIS: ICD-10-CM

## 2022-02-10 DIAGNOSIS — I10 ESSENTIAL HYPERTENSION: ICD-10-CM

## 2022-02-10 DIAGNOSIS — M54.12 CERVICAL RADICULOPATHY: ICD-10-CM

## 2022-02-10 DIAGNOSIS — M47.22 OSTEOARTHRITIS OF SPINE WITH RADICULOPATHY, CERVICAL REGION: Primary | ICD-10-CM

## 2022-02-10 PROCEDURE — 1036F TOBACCO NON-USER: CPT | Performed by: INTERNAL MEDICINE

## 2022-02-10 PROCEDURE — G8427 DOCREV CUR MEDS BY ELIG CLIN: HCPCS | Performed by: INTERNAL MEDICINE

## 2022-02-10 PROCEDURE — 99213 OFFICE O/P EST LOW 20 MIN: CPT | Performed by: INTERNAL MEDICINE

## 2022-02-10 PROCEDURE — 3017F COLORECTAL CA SCREEN DOC REV: CPT | Performed by: INTERNAL MEDICINE

## 2022-02-10 PROCEDURE — G8484 FLU IMMUNIZE NO ADMIN: HCPCS | Performed by: INTERNAL MEDICINE

## 2022-02-10 PROCEDURE — G8419 CALC BMI OUT NRM PARAM NOF/U: HCPCS | Performed by: INTERNAL MEDICINE

## 2022-02-10 NOTE — PROGRESS NOTES
controlled due to not taking her BP meds for 2 days. Gastroesophageal reflux disease without esophagitis. Bilateral cervical radiculopathy. Discussion Notes: We will schedule patient for an MRI of the cervical spine and following that we will probably refer her to a spine specialist or pain management. She will get back on her metoprolol 25 mg daily and will monitor her blood pressure at home and will call if it does not come down to normal.  She will continue her other meds the same as listed on her med list.  She will get back on her BP meds today. She will monitor her blood pressure at home and call with her readings in a couple of weeks.

## 2022-03-03 ENCOUNTER — TELEPHONE (OUTPATIENT)
Dept: PRIMARY CARE CLINIC | Age: 62
End: 2022-03-03

## 2022-03-03 DIAGNOSIS — M54.12 CERVICAL RADICULOPATHY: Primary | ICD-10-CM

## 2022-05-06 DIAGNOSIS — M54.12 CERVICAL RADICULOPATHY: ICD-10-CM

## 2022-05-06 DIAGNOSIS — M54.2 NECK PAIN: Primary | ICD-10-CM

## 2022-05-07 ENCOUNTER — HOSPITAL ENCOUNTER (OUTPATIENT)
Dept: GENERAL RADIOLOGY | Age: 62
Discharge: HOME | End: 2022-05-09
Payer: COMMERCIAL

## 2022-05-07 ENCOUNTER — HOSPITAL ENCOUNTER (OUTPATIENT)
Age: 62
Discharge: HOME | End: 2022-05-09
Payer: COMMERCIAL

## 2022-05-07 DIAGNOSIS — M54.12 CERVICAL RADICULOPATHY: ICD-10-CM

## 2022-05-07 DIAGNOSIS — M54.2 NECK PAIN: ICD-10-CM

## 2022-05-07 PROCEDURE — 72040 X-RAY EXAM NECK SPINE 2-3 VW: CPT

## 2022-05-29 ENCOUNTER — HOSPITAL ENCOUNTER (OUTPATIENT)
Dept: MRI IMAGING | Age: 62
Discharge: HOME | End: 2022-05-31
Payer: COMMERCIAL

## 2022-05-29 DIAGNOSIS — M54.12 CERVICAL RADICULOPATHY: ICD-10-CM

## 2022-05-29 PROCEDURE — 72141 MRI NECK SPINE W/O DYE: CPT

## 2022-06-01 DIAGNOSIS — M54.12 CERVICAL RADICULOPATHY: Primary | ICD-10-CM

## 2022-07-11 DIAGNOSIS — I10 ESSENTIAL HYPERTENSION: ICD-10-CM

## 2022-08-01 ENCOUNTER — APPOINTMENT (OUTPATIENT)
Dept: GENERAL RADIOLOGY | Age: 62
End: 2022-08-01
Payer: COMMERCIAL

## 2022-08-01 VITALS
DIASTOLIC BLOOD PRESSURE: 98 MMHG | OXYGEN SATURATION: 99 % | BODY MASS INDEX: 24.64 KG/M2 | HEIGHT: 67 IN | TEMPERATURE: 97.9 F | WEIGHT: 157 LBS | RESPIRATION RATE: 16 BRPM | HEART RATE: 73 BPM | SYSTOLIC BLOOD PRESSURE: 167 MMHG

## 2022-08-01 LAB
BASOPHILS ABSOLUTE: 0.04 E9/L (ref 0–0.2)
BASOPHILS RELATIVE PERCENT: 0.8 % (ref 0–2)
EOSINOPHILS ABSOLUTE: 0.22 E9/L (ref 0.05–0.5)
EOSINOPHILS RELATIVE PERCENT: 4.3 % (ref 0–6)
HCT VFR BLD CALC: 42.7 % (ref 34–48)
HEMOGLOBIN: 14.7 G/DL (ref 11.5–15.5)
IMMATURE GRANULOCYTES #: 0.01 E9/L
IMMATURE GRANULOCYTES %: 0.2 % (ref 0–5)
LYMPHOCYTES ABSOLUTE: 1.51 E9/L (ref 1.5–4)
LYMPHOCYTES RELATIVE PERCENT: 29.8 % (ref 20–42)
MCH RBC QN AUTO: 30.8 PG (ref 26–35)
MCHC RBC AUTO-ENTMCNC: 34.4 % (ref 32–34.5)
MCV RBC AUTO: 89.3 FL (ref 80–99.9)
MONOCYTES ABSOLUTE: 0.49 E9/L (ref 0.1–0.95)
MONOCYTES RELATIVE PERCENT: 9.7 % (ref 2–12)
NEUTROPHILS ABSOLUTE: 2.79 E9/L (ref 1.8–7.3)
NEUTROPHILS RELATIVE PERCENT: 55.2 % (ref 43–80)
PDW BLD-RTO: 11.9 FL (ref 11.5–15)
PLATELET # BLD: 204 E9/L (ref 130–450)
PMV BLD AUTO: 10 FL (ref 7–12)
RBC # BLD: 4.78 E12/L (ref 3.5–5.5)
WBC # BLD: 5.1 E9/L (ref 4.5–11.5)

## 2022-08-01 PROCEDURE — 83880 ASSAY OF NATRIURETIC PEPTIDE: CPT

## 2022-08-01 PROCEDURE — 84484 ASSAY OF TROPONIN QUANT: CPT

## 2022-08-01 PROCEDURE — 93005 ELECTROCARDIOGRAM TRACING: CPT | Performed by: PHYSICIAN ASSISTANT

## 2022-08-01 PROCEDURE — 83690 ASSAY OF LIPASE: CPT

## 2022-08-01 PROCEDURE — 85025 COMPLETE CBC W/AUTO DIFF WBC: CPT

## 2022-08-01 PROCEDURE — 4500000002 HC ER NO CHARGE

## 2022-08-01 PROCEDURE — 71046 X-RAY EXAM CHEST 2 VIEWS: CPT

## 2022-08-01 PROCEDURE — 80053 COMPREHEN METABOLIC PANEL: CPT

## 2022-08-02 ENCOUNTER — HOSPITAL ENCOUNTER (EMERGENCY)
Age: 62
Discharge: LEFT BEFORE DISCHARGE | End: 2022-08-02
Payer: COMMERCIAL

## 2022-08-02 ENCOUNTER — TELEPHONE (OUTPATIENT)
Dept: PRIMARY CARE CLINIC | Age: 62
End: 2022-08-02

## 2022-08-02 LAB
ALBUMIN SERPL-MCNC: 4.4 G/DL (ref 3.5–5.2)
ALP BLD-CCNC: 136 U/L (ref 35–104)
ALT SERPL-CCNC: 19 U/L (ref 0–32)
ANION GAP SERPL CALCULATED.3IONS-SCNC: 12 MMOL/L (ref 7–16)
AST SERPL-CCNC: 18 U/L (ref 0–31)
BILIRUB SERPL-MCNC: 0.3 MG/DL (ref 0–1.2)
BUN BLDV-MCNC: 15 MG/DL (ref 6–23)
CALCIUM SERPL-MCNC: 9.5 MG/DL (ref 8.6–10.2)
CHLORIDE BLD-SCNC: 104 MMOL/L (ref 98–107)
CO2: 24 MMOL/L (ref 22–29)
CREAT SERPL-MCNC: 0.8 MG/DL (ref 0.5–1)
EKG ATRIAL RATE: 86 BPM
EKG P AXIS: 59 DEGREES
EKG P-R INTERVAL: 144 MS
EKG Q-T INTERVAL: 380 MS
EKG QRS DURATION: 88 MS
EKG QTC CALCULATION (BAZETT): 454 MS
EKG R AXIS: 8 DEGREES
EKG T AXIS: 16 DEGREES
EKG VENTRICULAR RATE: 86 BPM
GFR AFRICAN AMERICAN: >60
GFR NON-AFRICAN AMERICAN: >60 ML/MIN/1.73
GLUCOSE BLD-MCNC: 111 MG/DL (ref 74–99)
LIPASE: 27 U/L (ref 13–60)
POTASSIUM SERPL-SCNC: 3.6 MMOL/L (ref 3.5–5)
PRO-BNP: 37 PG/ML (ref 0–125)
SODIUM BLD-SCNC: 140 MMOL/L (ref 132–146)
TOTAL PROTEIN: 7.4 G/DL (ref 6.4–8.3)
TROPONIN, HIGH SENSITIVITY: <6 NG/L (ref 0–9)

## 2022-08-02 NOTE — TELEPHONE ENCOUNTER
Patient phoned stating she went to ER yesterday for chest pain, left arm pain, and jaw pain. She states her chest pain today feel like her chest is tight and going to burst.  She thought maybe it was her reflux because she has a lot of acid coming up her throat and maybe her neck is causing her arm pain. She states she took an extra Prilosec after she left the ER and it didn't help. Please advise.

## 2022-08-02 NOTE — ED NOTES
Department of Emergency Medicine  FIRST PROVIDER TRIAGE NOTE             Independent MLP           8/2/22  1:03 AM EDT    Date of Encounter: 8/2/22   MRN: [id number]      HPI: Suzanne Evans is a 64 y.o. female who presents to the ED for Chest Pain (Pt stated it started 45 mins. Numbness down right arm and in left hand)   Pt presents to ED with chest pain this evening described as sharp. She reports the pain was in her left upper back and down her left arm and both of her hands were numb. The symptoms have resolved at this time. She has history of cervical disc bulging and thought initially the pain was from that because sometimes it goes through to her chest but is usually dull. She has hx of HTN.    ROS: Negative for sob, abd pain, fever, cough, vomiting, diarrhea, urinary complaints, headache, or dizziness. PE: Gen Appearance/Constitutional: alert  CV: regular rate  Pulm: CTA bilat  Musculoskeletal: no swelling     Initial Plan of Care: All treatment areas with department are currently occupied. Plan to order/Initiate the following while awaiting opening in ED: labs, EKG, and imaging studies.   Initiate Treatment-Testing, Proceed toTreatment Area When Bed Available for ED Attending/MLP to Continue Care    Electronically signed by Dewayne Burnett PA-C   DD: 8/2/22       Dewayne Burnett PA-C  08/02/22 [id number]

## 2022-08-02 NOTE — ED NOTES
Patient did not want to wait any more and signed an ED withdraw of request for medical  Treatment form.      Shashi Villa RN  08/02/22 [id number]

## 2022-08-08 ENCOUNTER — OFFICE VISIT (OUTPATIENT)
Dept: PAIN MANAGEMENT | Age: 62
End: 2022-08-08
Payer: COMMERCIAL

## 2022-08-08 ENCOUNTER — PREP FOR PROCEDURE (OUTPATIENT)
Dept: PAIN MANAGEMENT | Age: 62
End: 2022-08-08

## 2022-08-08 VITALS
OXYGEN SATURATION: 97 % | DIASTOLIC BLOOD PRESSURE: 88 MMHG | WEIGHT: 157 LBS | HEIGHT: 65 IN | SYSTOLIC BLOOD PRESSURE: 136 MMHG | TEMPERATURE: 97.5 F | RESPIRATION RATE: 16 BRPM | BODY MASS INDEX: 26.16 KG/M2 | HEART RATE: 70 BPM

## 2022-08-08 DIAGNOSIS — M50.30 DDD (DEGENERATIVE DISC DISEASE), CERVICAL: ICD-10-CM

## 2022-08-08 DIAGNOSIS — M54.12 CERVICAL RADICULAR PAIN: ICD-10-CM

## 2022-08-08 DIAGNOSIS — M47.812 CERVICAL SPONDYLOSIS: ICD-10-CM

## 2022-08-08 DIAGNOSIS — M54.2 CERVICALGIA: ICD-10-CM

## 2022-08-08 DIAGNOSIS — Z98.1 HX OF FUSION OF CERVICAL SPINE: Primary | ICD-10-CM

## 2022-08-08 DIAGNOSIS — M54.12 CERVICAL RADICULAR PAIN: Primary | ICD-10-CM

## 2022-08-08 PROCEDURE — 1036F TOBACCO NON-USER: CPT | Performed by: ANESTHESIOLOGY

## 2022-08-08 PROCEDURE — G8419 CALC BMI OUT NRM PARAM NOF/U: HCPCS | Performed by: ANESTHESIOLOGY

## 2022-08-08 PROCEDURE — G8427 DOCREV CUR MEDS BY ELIG CLIN: HCPCS | Performed by: ANESTHESIOLOGY

## 2022-08-08 PROCEDURE — 99204 OFFICE O/P NEW MOD 45 MIN: CPT | Performed by: ANESTHESIOLOGY

## 2022-08-08 PROCEDURE — 3017F COLORECTAL CA SCREEN DOC REV: CPT | Performed by: ANESTHESIOLOGY

## 2022-08-08 PROCEDURE — 99204 OFFICE O/P NEW MOD 45 MIN: CPT

## 2022-08-08 RX ORDER — NAPROXEN SODIUM 220 MG
220 TABLET ORAL 2 TIMES DAILY WITH MEALS
COMMUNITY

## 2022-08-08 RX ORDER — TIZANIDINE 2 MG/1
2 TABLET ORAL NIGHTLY PRN
Qty: 20 TABLET | Refills: 1 | Status: SHIPPED | OUTPATIENT
Start: 2022-08-08

## 2022-08-08 RX ORDER — SODIUM CHLORIDE 0.9 % (FLUSH) 0.9 %
5-40 SYRINGE (ML) INJECTION PRN
Status: CANCELLED | OUTPATIENT
Start: 2022-08-08

## 2022-08-08 RX ORDER — NABUMETONE 750 MG/1
750 TABLET, FILM COATED ORAL 2 TIMES DAILY PRN
Qty: 40 TABLET | Refills: 1 | Status: SHIPPED | OUTPATIENT
Start: 2022-08-08

## 2022-08-08 RX ORDER — SODIUM CHLORIDE 0.9 % (FLUSH) 0.9 %
5-40 SYRINGE (ML) INJECTION EVERY 12 HOURS SCHEDULED
Status: CANCELLED | OUTPATIENT
Start: 2022-08-08

## 2022-08-08 RX ORDER — SODIUM CHLORIDE 9 MG/ML
INJECTION, SOLUTION INTRAVENOUS PRN
Status: CANCELLED | OUTPATIENT
Start: 2022-08-08

## 2022-08-08 NOTE — PROGRESS NOTES
[facility] Pain Management        [address] [place], [place] [id number]  Dept: [phone]          Consult Note      Patient:  NEHEMIAS Moseley 1960    Date of Service:  22     Requesting Physician:  Can Aguilar MD    Reason for Consult:      Patient presents with complaints of neck pain    HISTORY OF PRESENT ILLNESS:      Ms. Benita Haynes is a 64 y.o. female presented today to [address] for evaluation of chronic neck pain. Prior ACDF C5-7 in 2010 [facility]. Did well until past couple of yrs- has recurrence of neck pain. Neck pain and shoulder blade pain and intermittent UE symptoms. She has CTS and has b/l hand tingling and numbness - treated conservatively. Pain is constant and is described as aching and throbbing. Patient does not have new bladder or bowel dysfunction. H/o Chronic ANA ROSA+    Alleviating factors include: rest. Aggravating factors include: movement, bending, lifting. Pain causes functional limitations/ limits Adl's : Yes    Nursing notes and details of the pain history reviewed. Please see intake notes for details. Allow has chronic low back pain- but today's visit will be focussed only on neck pain. Will reeval ow back pain issue in follow up visits. 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 Has been evaluated in the past.  Has h/o chronic headache/ migraine. 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   Status post anterior   cervical discectomy and fusion from C5-C7 the vertebral body heights are   preserved. No marrow edema or infiltrative process noted. SPINAL CORD: No abnormal cord signal is seen. SOFT TISSUES: No paraspinal mass identified. C2-C3: Disc desiccation with a small disc bulge. Mild facet hypertrophy. No   significant central canal stenosis. Mild neural foraminal stenoses. C3-C4: No significant disc herniation. Right greater than left facet and   uncovertebral joint hypertrophy. No central canal stenosis. Severe right   and mild-to-moderate left neural foraminal stenoses. C4-C5: Prominent loss of disc height with a disc osteophyte complex. Facet   and uncovertebral joint hypertrophic changes are noted. Mild central canal   stenosis. Mild right and mild-to-moderate left neural foraminal stenoses. C5-C6: Status post ACDF. Mild facet hypertrophic changes. Moderate right   and mild left neural foraminal stenoses. C6-C7: Status post ACDF. No central canal stenosis. Facet and uncovertebral   joint hypertrophy. Moderate neural foraminal stenoses. C7-T1: Small disc bulge. Mild facet hypertrophy. No central canal stenosis. Mild neural foraminal foraminal stenoses. Impression   1. No fracture or bony destructive lesion. 2. Status post ACDF from C5-C7. 3. Mild central canal stenosis at C4-5.   4.  Multilevel neural foraminal stenoses, worst (severe) at the right C3-4   level. Xray C spine: 5/7/2022:  FINDINGS:   There is slight degenerative anterior subluxation of C2 with respect to C3   and C3 with respect to C4. There are small anterior posterior spurs at C3-4   and C4-5. There is hardware transfixing C5-C7. There is no prevertebral   soft tissue swelling. C7-T1 is not well delineated.            Impression   Degenerative changes as described without acute process     Past Medical History:   Diagnosis Date    Gastritis     GERD (gastroesophageal reflux disease)     Hypertension Hypothyroidism     Overweight     Spastic bladder     Spastic colon     Sprain of right knee     Thyroid disease     Vitamin D deficiency        Past Surgical History:   Procedure Laterality Date    CERVICAL FUSION       SECTION      CHOLECYSTECTOMY      COLONOSCOPY      HYSTERECTOMY (CERVIX STATUS UNKNOWN)      TUBAL LIGATION         Prior to Admission medications    Medication Sig Start Date End Date Taking? Authorizing Provider   fluocinonide (LIDEX) 0.05 % cream apply to affected area ONCE TO TWICE A DAY if needed for up to 2 ...  (REFER TO PRESCRIPTION NOTES). 22  Yes Historical Provider, MD   naproxen sodium (ALEVE) 220 MG tablet Take 220 mg by mouth in the morning and 220 mg in the evening. Take with meals. Yes Historical Provider, MD   metoprolol tartrate (LOPRESSOR) 25 MG tablet Take 1 tablet by mouth Daily with lunch 22  Yes Taylor Leonard MD   levothyroxine (SYNTHROID) 137 MCG tablet Take 1 tablet by mouth Daily 22  Yes Taylor Leonard MD   rizatriptan (MAXALT-MLT) 10 MG disintegrating tablet Take 1 tablet by mouth once as needed for Migraine May repeat in 2 hours if needed 4/15/21 8/8/22 Yes Taylor Leonard MD   ibuprofen (ADVIL;MOTRIN) 800 MG tablet prn   Yes Historical Provider, MD   omeprazole (PRILOSEC) 10 MG capsule Take 10 mg by mouth daily   Yes Historical Provider, MD       Allergies   Allergen Reactions    Promethazine Hcl Swelling     Other reaction(s):  Intolerance  Tongue swelling, palpitations    Promethazine Palpitations and Swelling       Social History     Socioeconomic History    Marital status:      Spouse name: Not on file    Number of children: Not on file    Years of education: Not on file    Highest education level: Not on file   Occupational History    Not on file   Tobacco Use    Smoking status: Never    Smokeless tobacco: Never   Substance and Sexual Activity    Alcohol use: Yes     Comment: social    Drug use: No    Sexual activity: Not on file Other Topics Concern    Not on file   Social History Narrative    Not on file     Social Determinants of Health     Financial Resource Strain: Not on file   Food Insecurity: Not on file   Transportation Needs: Not on file   Physical Activity: Not on file   Stress: Not on file   Social Connections: Not on file   Intimate Partner Violence: Not on file   Housing Stability: Not on file       Family History   Problem Relation Age of Onset    Asthma Mother     Arthritis Mother     Fibromyalgia Mother     Cancer Father     Arthritis Father     Heart Disease Father     Coronary Art Dis Father     Asthma Maternal Grandfather     Heart Disease Paternal Grandmother        REVIEW OF SYSTEMS:     Patient specifically denies fever/chills, chest pain, shortness of breath, new bowel or bladder complaints. All other review of systems was negative. Review of Systems - reviewed. PHYSICAL EXAMINATION:      /88   Pulse 70   Temp 97.5 °F (36.4 °C) (Infrared)   Resp 16   Ht 5' 5\" (1.651 m)   Wt 157 lb (71.2 kg)   SpO2 97%   BMI 26.13 kg/m²     General:      General appearance:  Pleasant and well-hydrated, in no distress and A & O x 3  Build: normal  Function: Rises from seated position easily    HEENT:    Head:normocephalic, atraumatic    Lungs:    Breathing:normal breathing pattern     CVS:     RRR    Abdomen:    Shape:non-distended and normal  Tenderness:none  Guarding:none    Cervical spine:    Inspection:normal  Palpation:tenderness paravertebral muscles, tenderness trapezium, left, right and positive. Range of motion:reduced flexion, extension, rotation bilaterally and is painful. facet tenderness +  Spurling's: negative bilaterally    Thoracic spine:     Spine inspection:normal   Palpation:No tenderness over the midline and paraspinal area, bilaterally  Range of motion:normal in flexion, extension rotation bilateral and is not painful.     Lumbar spine:    Spine inspection: Normal   Palpation: Tenderness for referring Ms. Eulalia De La Fuente and allowing us to participate in her care. Please do not hesitate to contact me if you have any questions regarding her care.     Penelope Song MD    CC:    Didier Garcia MD  [address] [place] [address]

## 2022-08-08 NOTE — H&P (VIEW-ONLY)
[facility] Pain Management        [address] [place] [address]  Dept: [phone]          Consult Note      Patient:  NEHEMIAS Burden 1960    Date of Service:  22     Requesting Physician:  Poppy Calvillo MD    Reason for Consult:      Patient presents with complaints of neck pain    HISTORY OF PRESENT ILLNESS:      Ms. Eulalia De La Fuente is a 64 y.o. female presented today to [facility] for evaluation of chronic neck pain. Prior ACDF C5-7 in 2010 at [facility]. Did well until past couple of yrs- has recurrence of neck pain. Neck pain and shoulder blade pain and intermittent UE symptoms. She has CTS and has b/l hand tingling and numbness - treated conservatively. Pain is constant and is described as aching and throbbing. Patient does not have new bladder or bowel dysfunction. H/o Chronic ANA ROSA+    Alleviating factors include: rest. Aggravating factors include: movement, bending, lifting. Pain causes functional limitations/ limits Adl's : Yes    Nursing notes and details of the pain history reviewed. Please see intake notes for details. Allow has chronic low back pain- but today's visit will be focussed only on neck pain. Will reeval ow back pain issue in follow up visits. 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 Has been evaluated in the past.  Has h/o chronic headache/ migraine. 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   Status post anterior   cervical discectomy and fusion from C5-C7 the vertebral body heights are   preserved. No marrow edema or infiltrative process noted. SPINAL CORD: No abnormal cord signal is seen. SOFT TISSUES: No paraspinal mass identified. C2-C3: Disc desiccation with a small disc bulge. Mild facet hypertrophy. No   significant central canal stenosis. Mild neural foraminal stenoses. C3-C4: No significant disc herniation. Right greater than left facet and   uncovertebral joint hypertrophy. No central canal stenosis. Severe right   and mild-to-moderate left neural foraminal stenoses. C4-C5: Prominent loss of disc height with a disc osteophyte complex. Facet   and uncovertebral joint hypertrophic changes are noted. Mild central canal   stenosis. Mild right and mild-to-moderate left neural foraminal stenoses. C5-C6: Status post ACDF. Mild facet hypertrophic changes. Moderate right   and mild left neural foraminal stenoses. C6-C7: Status post ACDF. No central canal stenosis. Facet and uncovertebral   joint hypertrophy. Moderate neural foraminal stenoses. C7-T1: Small disc bulge. Mild facet hypertrophy. No central canal stenosis. Mild neural foraminal foraminal stenoses. Impression   1. No fracture or bony destructive lesion. 2. Status post ACDF from C5-C7. 3. Mild central canal stenosis at C4-5.   4.  Multilevel neural foraminal stenoses, worst (severe) at the right C3-4   level. Xray C spine: 5/7/2022:  FINDINGS:   There is slight degenerative anterior subluxation of C2 with respect to C3   and C3 with respect to C4. There are small anterior posterior spurs at C3-4   and C4-5. There is hardware transfixing C5-C7. There is no prevertebral   soft tissue swelling. C7-T1 is not well delineated.            Impression   Degenerative changes as described without acute process     Past Medical History:   Diagnosis Date    Gastritis     GERD (gastroesophageal reflux disease)     Hypertension Hypothyroidism     Overweight     Spastic bladder     Spastic colon     Sprain of right knee     Thyroid disease     Vitamin D deficiency        Past Surgical History:   Procedure Laterality Date    CERVICAL FUSION       SECTION      CHOLECYSTECTOMY      COLONOSCOPY      HYSTERECTOMY (CERVIX STATUS UNKNOWN)      TUBAL LIGATION         Prior to Admission medications    Medication Sig Start Date End Date Taking? Authorizing Provider   fluocinonide (LIDEX) 0.05 % cream apply to affected area ONCE TO TWICE A DAY if needed for up to 2 ...  (REFER TO PRESCRIPTION NOTES). 22  Yes Historical Provider, MD   naproxen sodium (ALEVE) 220 MG tablet Take 220 mg by mouth in the morning and 220 mg in the evening. Take with meals. Yes Historical Provider, MD   metoprolol tartrate (LOPRESSOR) 25 MG tablet Take 1 tablet by mouth Daily with lunch 22  Yes Celia Doctor, MD   levothyroxine (SYNTHROID) 137 MCG tablet Take 1 tablet by mouth Daily 22  Yes Celia Payne MD   rizatriptan (MAXALT-MLT) 10 MG disintegrating tablet Take 1 tablet by mouth once as needed for Migraine May repeat in 2 hours if needed 4/15/21 8/8/22 Yes Celia Payne MD   ibuprofen (ADVIL;MOTRIN) 800 MG tablet prn   Yes Historical Provider, MD   omeprazole (PRILOSEC) 10 MG capsule Take 10 mg by mouth daily   Yes Historical Provider, MD       Allergies   Allergen Reactions    Promethazine Hcl Swelling     Other reaction(s):  Intolerance  Tongue swelling, palpitations    Promethazine Palpitations and Swelling       Social History     Socioeconomic History    Marital status:      Spouse name: Not on file    Number of children: Not on file    Years of education: Not on file    Highest education level: Not on file   Occupational History    Not on file   Tobacco Use    Smoking status: Never    Smokeless tobacco: Never   Substance and Sexual Activity    Alcohol use: Yes     Comment: social    Drug use: No    Sexual activity: Not on file Other Topics Concern    Not on file   Social History Narrative    Not on file     Social Determinants of Health     Financial Resource Strain: Not on file   Food Insecurity: Not on file   Transportation Needs: Not on file   Physical Activity: Not on file   Stress: Not on file   Social Connections: Not on file   Intimate Partner Violence: Not on file   Housing Stability: Not on file       Family History   Problem Relation Age of Onset    Asthma Mother     Arthritis Mother     Fibromyalgia Mother     Cancer Father     Arthritis Father     Heart Disease Father     Coronary Art Dis Father     Asthma Maternal Grandfather     Heart Disease Paternal Grandmother        REVIEW OF SYSTEMS:     Patient specifically denies fever/chills, chest pain, shortness of breath, new bowel or bladder complaints. All other review of systems was negative. Review of Systems - reviewed. PHYSICAL EXAMINATION:      /88   Pulse 70   Temp 97.5 °F (36.4 °C) (Infrared)   Resp 16   Ht 5' 5\" (1.651 m)   Wt 157 lb (71.2 kg)   SpO2 97%   BMI 26.13 kg/m²     General:      General appearance:  Pleasant and well-hydrated, in no distress and A & O x 3  Build: normal  Function: Rises from seated position easily    HEENT:    Head:normocephalic, atraumatic    Lungs:    Breathing:normal breathing pattern     CVS:     RRR    Abdomen:    Shape:non-distended and normal  Tenderness:none  Guarding:none    Cervical spine:    Inspection:normal  Palpation:tenderness paravertebral muscles, tenderness trapezium, left, right and positive. Range of motion:reduced flexion, extension, rotation bilaterally and is painful. facet tenderness +  Spurling's: negative bilaterally    Thoracic spine:     Spine inspection:normal   Palpation:No tenderness over the midline and paraspinal area, bilaterally  Range of motion:normal in flexion, extension rotation bilateral and is not painful.     Lumbar spine:    Spine inspection: Normal   Palpation: Tenderness paravertebral muscles Yes bilaterally  Range of motion: Decreased, flexion Decreased,  Sacroiliac joint tenderness No bilaterally  SLR : negative bilaterally    Musculoskeletal:    Trigger points no    Extremities:    Tremors:None bilaterally upper and lower  Edema:no    Neurological:    Sensory: Normal to light touch     Motor:   Right  5/5              Left  5/5               Right Bicep 5/5           Left Bicep 5/5              Right Triceps 5/5       Left Triceps 5/5          Right Deltoid 5/5     Left Deltoid 5/5                  Right Quadriceps 5/5          Left Quadriceps 5/5           Right Gastrocnemius 5/5    Left Gastrocnemius 5/5  Right Ant Tibialis 5/5  Left Ant Tibialis 5/5    Reflexes:    B/l equal    Dermatology:    Skin:no rashes or lesions noted    Assessment/Plan:     Diagnosis Orders   1. Hx of fusion of cervical spine        2. Cervical radicular pain        3. DDD (degenerative disc disease), cervical        4. Cervical spondylosis        5. Cervicalgia            64 y.o. female with h/o prior C5-7 ACDF in 2010 at [facility]. Did very well until recently- has noticed on and off neck pain and intermittent left UE pain. Failed conservative treatment. Recent MRI fo C spine reviewed. Also has bl CTS. Has low back pain issue- will revisit this option in follow up visits. Plan:  Cervical RAFAEL under fluorsoscopy. RBA discussed. Relafen    Zanaflex    Offered trial of Neurontin- patient not too keen on meds. Consider cervical facet MBNB in future if axial neck pain persists. Recommend EMG/ NCS to eval CTS. Wants to revisit this later. Counseling : Patient encouraged to stay active and to continue Regular home exercise program as tolerated - stretching / strengthening. Treatment plan discussed with the patient including medication and procedure side effects. Controlled Substances Monitoring: [place] reviewed.     Og Dasilva MD    Dear Dr. Alaina Mcgarry,   Thank you for referring Ms. Agustín Murphy and allowing us to participate in her care. Please do not hesitate to contact me if you have any questions regarding her care.     Wilma Sanchez MD    CC:    Virginia Elizabeth MD  [address] [place] [address]

## 2022-08-08 NOTE — PROGRESS NOTES
Patient:  NEHEMIAS Paul 1960  Date of Service:  22      Do you currently have any of the following:    Fever: No  Headache:  No  Cough: No  Shortness of breath: No  Exposed to anyone with these symptoms: No       Patient presents with complaints of neck pain that started 20 years ago and has been getting worse. She states the pain began following No specific cause    Pain is constant and is described as aching, stabbing, sharp, burning, nagging, and numb. She rates the pain as a 5/10 on her worst day , 9/10 on her best day, and a 6/10 on average on the VAS scale. Pain does radiate to the upper extremities. She  has numbness, tingling of the the upper extremities. Alleviating factors include: heat and OTC NSAIDS. Aggravating factors include:  movement. She states that the pain does keep her from sleeping at night. She took her last dose of  Aleve  last night. She is  on NSAIDS and  is not on anticoagulation medications. .     Previous treatments: Surgery and medications. .      Personal Expectations from this treatment:  more mobility and decrease pain    /88   Pulse 70   Temp 97.5 °F (36.4 °C) (Infrared)   Resp 16   Ht 5' 5\" (1.651 m)   Wt 157 lb (71.2 kg)   SpO2 97%   BMI 26.13 kg/m²     No LMP recorded. Patient has had a hysterectomy.

## 2022-08-25 ENCOUNTER — TELEPHONE (OUTPATIENT)
Dept: PAIN MANAGEMENT | Age: 62
End: 2022-08-25

## 2022-08-29 NOTE — PROGRESS NOTES
[facility]  INSTRUCTIONS  . .......................................................................................................................................... [x] Parking the day of Surgery is located in the [facility].   Upon entering the door, make immediate right into the surgery reception room    [x]  Bring photo ID and insurance card     [x] You may have a light breakfast day of procedure    [x]  Wear loose comfortable clothing    [x]  Please follow instructions for medications as given per Dr's office    [x] You can expect a call the business day prior to procedure to notify you of your arrival time     [x] Please arrange for     []  Other instructions

## 2022-08-30 ENCOUNTER — TELEPHONE (OUTPATIENT)
Dept: PAIN MANAGEMENT | Age: 62
End: 2022-08-30

## 2022-08-30 DIAGNOSIS — M50.30 DDD (DEGENERATIVE DISC DISEASE), CERVICAL: Primary | ICD-10-CM

## 2022-08-30 RX ORDER — METHYLPREDNISOLONE 4 MG/1
TABLET ORAL
Qty: 1 KIT | Refills: 0 | Status: SHIPPED | OUTPATIENT
Start: 2022-08-30 | End: 2022-09-05

## 2022-08-30 NOTE — TELEPHONE ENCOUNTER
Apparently having Low back pain. Will e-scribe Medrol dose pack    Short course of ultracet for prn use.     Leana Crowley MD

## 2022-09-01 ENCOUNTER — HOSPITAL ENCOUNTER (OUTPATIENT)
Age: 62
Setting detail: OUTPATIENT SURGERY
Discharge: HOME | End: 2022-09-01
Attending: ANESTHESIOLOGY | Admitting: ANESTHESIOLOGY
Payer: COMMERCIAL

## 2022-09-01 ENCOUNTER — HOSPITAL ENCOUNTER (OUTPATIENT)
Dept: GENERAL RADIOLOGY | Age: 62
Setting detail: OUTPATIENT SURGERY
Discharge: HOME | End: 2022-09-03
Attending: ANESTHESIOLOGY
Payer: COMMERCIAL

## 2022-09-01 VITALS
TEMPERATURE: 97 F | WEIGHT: 158 LBS | HEART RATE: 66 BPM | BODY MASS INDEX: 24.8 KG/M2 | SYSTOLIC BLOOD PRESSURE: 158 MMHG | OXYGEN SATURATION: 96 % | DIASTOLIC BLOOD PRESSURE: 97 MMHG | RESPIRATION RATE: 16 BRPM | HEIGHT: 67 IN

## 2022-09-01 DIAGNOSIS — R52 PAIN: ICD-10-CM

## 2022-09-01 PROCEDURE — 6360000002 HC RX W HCPCS: Performed by: ANESTHESIOLOGY

## 2022-09-01 PROCEDURE — 7100000010 HC PHASE II RECOVERY - FIRST 15 MIN: Performed by: ANESTHESIOLOGY

## 2022-09-01 PROCEDURE — 7100000011 HC PHASE II RECOVERY - ADDTL 15 MIN: Performed by: ANESTHESIOLOGY

## 2022-09-01 PROCEDURE — 2580000003 HC RX 258: Performed by: ANESTHESIOLOGY

## 2022-09-01 PROCEDURE — 3209999900 FLUORO FOR SURGICAL PROCEDURES

## 2022-09-01 PROCEDURE — 6360000004 HC RX CONTRAST MEDICATION: Performed by: ANESTHESIOLOGY

## 2022-09-01 PROCEDURE — 2709999900 HC NON-CHARGEABLE SUPPLY: Performed by: ANESTHESIOLOGY

## 2022-09-01 PROCEDURE — A4216 STERILE WATER/SALINE, 10 ML: HCPCS | Performed by: ANESTHESIOLOGY

## 2022-09-01 PROCEDURE — 3600000002 HC SURGERY LEVEL 2 BASE: Performed by: ANESTHESIOLOGY

## 2022-09-01 PROCEDURE — 3600000012 HC SURGERY LEVEL 2 ADDTL 15MIN: Performed by: ANESTHESIOLOGY

## 2022-09-01 PROCEDURE — 2500000003 HC RX 250 WO HCPCS: Performed by: ANESTHESIOLOGY

## 2022-09-01 PROCEDURE — 62321 NJX INTERLAMINAR CRV/THRC: CPT | Performed by: ANESTHESIOLOGY

## 2022-09-01 RX ORDER — SODIUM CHLORIDE 0.9 % (FLUSH) 0.9 %
5-40 SYRINGE (ML) INJECTION PRN
Status: DISCONTINUED | OUTPATIENT
Start: 2022-09-01 | End: 2022-09-01 | Stop reason: HOSPADM

## 2022-09-01 RX ORDER — SODIUM CHLORIDE 9 MG/ML
INJECTION, SOLUTION INTRAVENOUS PRN
Status: DISCONTINUED | OUTPATIENT
Start: 2022-09-01 | End: 2022-09-01 | Stop reason: HOSPADM

## 2022-09-01 RX ORDER — SODIUM CHLORIDE 0.9 % (FLUSH) 0.9 %
5-40 SYRINGE (ML) INJECTION EVERY 12 HOURS SCHEDULED
Status: DISCONTINUED | OUTPATIENT
Start: 2022-09-01 | End: 2022-09-01 | Stop reason: HOSPADM

## 2022-09-01 RX ORDER — SODIUM CHLORIDE 9 MG/ML
INJECTION INTRAVENOUS PRN
Status: DISCONTINUED | OUTPATIENT
Start: 2022-09-01 | End: 2022-09-01 | Stop reason: ALTCHOICE

## 2022-09-01 RX ORDER — LIDOCAINE HYDROCHLORIDE 5 MG/ML
INJECTION, SOLUTION INFILTRATION; INTRAVENOUS PRN
Status: DISCONTINUED | OUTPATIENT
Start: 2022-09-01 | End: 2022-09-01 | Stop reason: ALTCHOICE

## 2022-09-01 RX ORDER — METHYLPREDNISOLONE ACETATE 40 MG/ML
INJECTION, SUSPENSION INTRA-ARTICULAR; INTRALESIONAL; INTRAMUSCULAR; SOFT TISSUE PRN
Status: DISCONTINUED | OUTPATIENT
Start: 2022-09-01 | End: 2022-09-01 | Stop reason: ALTCHOICE

## 2022-09-01 ASSESSMENT — PAIN SCALES - GENERAL: PAINLEVEL_OUTOF10: 0

## 2022-09-01 ASSESSMENT — PAIN - FUNCTIONAL ASSESSMENT: PAIN_FUNCTIONAL_ASSESSMENT: 0-10

## 2022-09-01 NOTE — DISCHARGE INSTRUCTIONS
Silvio Cooks Dr. Charlyn Chamber Dr. Arthuro Kennedy Block/Radiofrequency  Home Going Instructions    1-Go home, rest for the remainder of the day  2-Please do not lift over 20 pounds the day of the injection  3-If you received sedation No: alcohol, driving, operating lawn mowers, plows, tractors or other dangerous equipment until next morning. Do not make important decisions or sign legal documents for 24 hours. You may experience light headedness, dizziness, nausea or sleepiness after sedation. Do not stay alone. A responsible adult must be with you for 24 hours. You could be nauseated from the medications you have received. Your IV site may be sore and bruised. 4-No dietary restrictions     5-Resume all medications the same day, blood thinners to be resumed 24 hours after injection if you were instructed to stop any. 6-Keep the surgical site clean and dry, you may shower the next morning and remove the      dressing. 7- No sitz baths, tub baths or hot tubs/swimming for 24 hours. 8- If you have any pain at the injection site(s), application of an ice pack to the area should be       helpful, 20 minutes on/20 minutes off for next 48 hours. 9- Call [facility] immediately at if you develop.   Fever greater than 100.4 F  Have bleeding or drainage from the puncture site  Have progressive Leg/arm numbness and or weakness  Loss of control of bowel and or bladder (wet/soil yourself)  Severe headache with inability to lift head  10-You may return to work the next day

## 2022-09-01 NOTE — INTERVAL H&P NOTE
Update History & Physical    The patient's History and Physical of August 8, 2022 was reviewed with the patient and I examined the patient. There was no change. The surgical site was confirmed by the patient and me. Plan: Cervical RAFAEL. The risks, benefits, expected outcome, and alternative to the recommended procedure have been discussed with the patient. Patient understands and wants to proceed with the procedure.      Electronically signed by Leana Crowley MD on 9/1/2022

## 2022-09-01 NOTE — OP NOTE
Operative Note      Patient: Lauren Couch  YOB: 1960  MRN: [id number]    Date of Procedure: 2022    Pre-Op Diagnosis: Cervical radicular pain [M54.12]    Post-Op Diagnosis: Same       Procedure(s):  CERVICAL EPIDURAL STEROID INJECTION UNDER FLUOROSCOPIC GUIDANCE AT C7-T1 LEFT PARAMENDIAN    Surgeon(s):  Mary Ann Barba MD    Assistant:   * No surgical staff found *    Anesthesia: Local    Estimated Blood Loss (mL): Minimal    Complications: None    Specimens:   * No specimens in log *    Implants:  * No implants in log *      Drains: * No LDAs found *    Findings: good needle placement    Detailed Description of Procedure:   2022    Patient: Lauren Couch  :  1960  Age:  64 y.o. Sex:  female     PRE-PROCEDURE DIAGNOSIS: Cervical degenerative disc disease, cervical radicular pain. POST-PROCEDURE DIAGNOSIS: Same. PROCEDURE: Fluoroscopic guided cervical epidural steroid injection, #1 at C7-T1 level. SURGEON: Mary Ann Barba MD    ANESTHESIA: Local    ESTIMATED BLOOD LOSS: None.  ______________________________________________________________________  BRIEF HISTORY:  Lauren Couch comes in today for the first  therapeutic cervical epidural injection under fluoroscopic guidance. The potential complications of this procedure were discussed with the her again today. She has elected to undergo the aforementioned procedureImmanuel Teresa complete History & Physical examination were reviewed in depth, a copy of which is in the chart. DESCRIPTION OF PROCEDURE:    After confirming written and informed consent, a time-out was performed and Brii name and date of birth, the procedure to be performed as well as the plan for the location of the needle insertion were confirmed. The patient was brought into the procedure room and placed in the prone position with the head flexed midline on the fluoroscopy table.  A pillow was placed under the patient's head to increase cervical interlaminar space. Standard monitors were placed, and vital signs were observed throughout the procedure. The area was prepped with chloraprep and the C7-T1 interspace was marked under fluoroscopy. The skin and subcutaneous tissues at the above level were anesthestized with 0.5% lidocaine. An # 18 gauge 3 1/2 tuohy epidural needle was inserted and advanced toward the inferior lamina until bony contact was made. The needle was then advanced superiorly toward epidural space . From this point on loss of resistance technique with 5 cc glass syringe was used to confirm entrance of the needle into the epidural space under intermittent lateral fluoroscopy. Once in the epidural space, negative aspiration for blood and CSF was confirmed . Needle tip placement was confirmed by visualizing epidural spread of 0.5-1 ml of Isovue M 300 visualized in both AP and lateral live fluoroscopic views. Then after negative aspiration, a solution of 0.9 % saline 3 ml and 60 mg DepoMedrol was easily injected. The needle was gently removed intact. The patient neck was cleaned and a Band-Aid was placed over the needle insertion point. Disposition the patient tolerated the procedure well and there were no complications . Vital signs remained stable throughout the procedure. The patient was escorted to the recovery area where they remained until discharge and written discharge instructions for the procedure were given. Plan: Victor M Marmolejo will return to our pain management center as scheduled.      Milagros Eagle MD

## 2022-09-12 ENCOUNTER — TELEPHONE (OUTPATIENT)
Dept: PRIMARY CARE CLINIC | Age: 62
End: 2022-09-12

## 2022-09-12 DIAGNOSIS — N39.0 URINARY TRACT INFECTION WITHOUT HEMATURIA, SITE UNSPECIFIED: Primary | ICD-10-CM

## 2022-09-12 RX ORDER — CIPROFLOXACIN 250 MG/1
250 TABLET, FILM COATED ORAL 2 TIMES DAILY
Qty: 14 TABLET | Refills: 0 | Status: SHIPPED | OUTPATIENT
Start: 2022-09-12 | End: 2022-09-19

## 2022-09-12 NOTE — TELEPHONE ENCOUNTER
Pt states she has put herself on amoxicillin for UTI symptoms, also she is taking OTC medication to subside the cramping she doesn't know exactly what it is, but she did describe her urin as orange so I was thinking she may be taking AZO, she's really busy with her  and everyday events she doesn't really have time to come in office can you prescribe her something without seeing her.

## 2022-09-21 ENCOUNTER — OFFICE VISIT (OUTPATIENT)
Dept: PAIN MANAGEMENT | Age: 62
End: 2022-09-21
Payer: COMMERCIAL

## 2022-09-21 VITALS
WEIGHT: 157 LBS | SYSTOLIC BLOOD PRESSURE: 153 MMHG | RESPIRATION RATE: 16 BRPM | HEIGHT: 66 IN | BODY MASS INDEX: 25.23 KG/M2 | DIASTOLIC BLOOD PRESSURE: 86 MMHG | OXYGEN SATURATION: 98 % | HEART RATE: 73 BPM | TEMPERATURE: 97 F

## 2022-09-21 DIAGNOSIS — M54.2 CERVICALGIA: ICD-10-CM

## 2022-09-21 DIAGNOSIS — M54.12 CERVICAL RADICULAR PAIN: ICD-10-CM

## 2022-09-21 DIAGNOSIS — G89.29 CHRONIC MYOFASCIAL PAIN: ICD-10-CM

## 2022-09-21 DIAGNOSIS — M50.30 DDD (DEGENERATIVE DISC DISEASE), CERVICAL: ICD-10-CM

## 2022-09-21 DIAGNOSIS — M47.812 CERVICAL SPONDYLOSIS: Primary | ICD-10-CM

## 2022-09-21 DIAGNOSIS — M79.18 CHRONIC MYOFASCIAL PAIN: ICD-10-CM

## 2022-09-21 PROCEDURE — 99213 OFFICE O/P EST LOW 20 MIN: CPT | Performed by: ANESTHESIOLOGY

## 2022-09-21 PROCEDURE — G8419 CALC BMI OUT NRM PARAM NOF/U: HCPCS | Performed by: ANESTHESIOLOGY

## 2022-09-21 PROCEDURE — 3017F COLORECTAL CA SCREEN DOC REV: CPT | Performed by: ANESTHESIOLOGY

## 2022-09-21 PROCEDURE — 1036F TOBACCO NON-USER: CPT | Performed by: ANESTHESIOLOGY

## 2022-09-21 PROCEDURE — 20553 NJX 1/MLT TRIGGER POINTS 3/>: CPT | Performed by: ANESTHESIOLOGY

## 2022-09-21 PROCEDURE — G8427 DOCREV CUR MEDS BY ELIG CLIN: HCPCS | Performed by: ANESTHESIOLOGY

## 2022-09-21 RX ORDER — BUPIVACAINE HYDROCHLORIDE 2.5 MG/ML
7 INJECTION, SOLUTION EPIDURAL; INFILTRATION; INTRACAUDAL ONCE
Status: COMPLETED | OUTPATIENT
Start: 2022-09-21 | End: 2022-09-21

## 2022-09-21 RX ORDER — METHYLPREDNISOLONE ACETATE 40 MG/ML
30 INJECTION, SUSPENSION INTRA-ARTICULAR; INTRALESIONAL; INTRAMUSCULAR; SOFT TISSUE ONCE
Status: COMPLETED | OUTPATIENT
Start: 2022-09-21 | End: 2022-09-21

## 2022-09-21 RX ADMIN — METHYLPREDNISOLONE ACETATE 30 MG: 40 INJECTION, SUSPENSION INTRA-ARTICULAR; INTRALESIONAL; INTRAMUSCULAR; SOFT TISSUE at 11:55

## 2022-09-21 RX ADMIN — BUPIVACAINE HYDROCHLORIDE 17.5 MG: 2.5 INJECTION, SOLUTION EPIDURAL; INFILTRATION; INTRACAUDAL at 11:54

## 2022-09-21 NOTE — PROGRESS NOTES
[facility]        [address]  [facility], [address]  Dept: [phone]      Follow up Note      Mel Hernandez     Date of Visit:  9/21/2022    CC:  Patient presents for follow up   Chief Complaint   Patient presents with    Follow-up     CERVICAL EPIDURAL STEROID INJECTION UNDER FLUOROSCOPIC GUIDANCE AT C7-T1 LEFT PARAMENDIAN       HPI:    Chronic neck pain. Prior ACD[facility] C5-7 in Nov 2010 [facility]. Neck pain and shoulder blade pain and intermittent UE symptoms. She has CTS and has b/l hand tingling and numbness - treated conservatively. Patient does not have new bladder or bowel dysfunction. H/o Chronic ANA ROSA+    Allow has chronic low back pain- getting chiro treatment. Will reeval low back pain issue in follow up visits. Nursing notes and details of the pain history reviewed. Please see intake notes for details. 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 Has been evaluated in the past.  Has h/o chronic headache/ migraine. 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 Status post anterior   cervical discectomy and fusion from C5-C7 the vertebral body heights are   preserved. No marrow edema or infiltrative process noted. SPINAL CORD: No abnormal cord signal is seen. SOFT TISSUES: No paraspinal mass identified. C2-C3: Disc desiccation with a small disc bulge. Mild facet hypertrophy. No   significant central canal stenosis. Mild neural foraminal stenoses.        C3-C4: No PROCEDURE Left 9/1/2022    CERVICAL EPIDURAL STEROID INJECTION UNDER FLUOROSCOPIC GUIDANCE AT C7-T1 LEFT PARAMENDIAN performed by Og Dasilva MD at [address]         Prior to Admission medications    Medication Sig Start Date End Date Taking? Authorizing Provider   fluocinonide (LIDEX) 0.05 % cream apply to affected area ONCE TO TWICE A DAY if needed for up to 2 ...  (REFER TO PRESCRIPTION NOTES). 6/16/22  Yes Historical Provider, MD   naproxen sodium (ANAPROX) 220 MG tablet Take 220 mg by mouth in the morning and 220 mg in the evening. Take with meals. Yes Historical Provider, MD   tiZANidine (ZANAFLEX) 2 MG tablet Take 1 tablet by mouth nightly as needed (muscle spasm) 8/8/22  Yes Og Dasilva MD   metoprolol tartrate (LOPRESSOR) 25 MG tablet Take 1 tablet by mouth Daily with lunch 7/11/22  Yes Marycarmen Bill MD   levothyroxine (SYNTHROID) 137 MCG tablet Take 1 tablet by mouth Daily 1/19/22  Yes Marycarmen Bill MD   omeprazole (PRILOSEC) 10 MG capsule Take 10 mg by mouth daily   Yes Historical Provider, MD   nabumetone (RELAFEN) 750 MG tablet Take 1 tablet by mouth 2 times daily as needed for Pain  Patient not taking: Reported on 9/21/2022 8/8/22   Og Dasilva MD   rizatriptan (MAXALT-MLT) 10 MG disintegrating tablet Take 1 tablet by mouth once as needed for Migraine May repeat in 2 hours if needed  Patient not taking: Reported on 9/21/2022 4/15/21 8/8/22  Marycarmen Bill MD       Allergies   Allergen Reactions    Promethazine Hcl Swelling     Other reaction(s):  Intolerance  Tongue swelling, palpitations       Social History     Socioeconomic History    Marital status:      Spouse name: Not on file    Number of children: Not on file    Years of education: Not on file    Highest education level: Not on file   Occupational History    Not on file   Tobacco Use    Smoking status: Never    Smokeless tobacco: Never   Vaping Use    Vaping Use: Never used   Substance and Sexual Activity    Alcohol use: Yes     Alcohol/week: 1.0 standard drink     Types: 1 Cans of beer per week     Comment: weekly    Drug use: No    Sexual activity: Not on file   Other Topics Concern    Not on file   Social History Narrative    Not on file     Social Determinants of Health     Financial Resource Strain: Not on file   Food Insecurity: Not on file   Transportation Needs: Not on file   Physical Activity: Not on file   Stress: Not on file   Social Connections: Not on file   Intimate Partner Violence: Not on file   Housing Stability: Not on file       Family History   Problem Relation Age of Onset    Asthma Mother     Arthritis Mother     Fibromyalgia Mother     Cancer Father     Arthritis Father     Heart Disease Father     Coronary Art Dis Father     Asthma Maternal Grandfather     Heart Disease Paternal Grandmother        REVIEW OF SYSTEMS:     Sarah Nair denies fever/chills, chest pain, shortness of breath, new bowel or bladder complaints. All other review of systems was negative. PHYSICAL EXAMINATION:      BP (!) 153/86   Pulse 73   Temp 97 °F (36.1 °C) (Infrared)   Resp 16   Ht 5' 6\" (1.676 m)   Wt 157 lb (71.2 kg)   SpO2 98%   BMI 25.34 kg/m²   General:       General appearance:  Pleasant and well-hydrated, in no distress and A & O x 3  Build: normal  Function: Rises from seated position easily     HEENT:     Head:normocephalic, atraumatic     Lungs:     Breathing:normal breathing pattern      CVS:     RRR     Abdomen:     Shape:non-distended and normal     Cervical spine:     Inspection:normal  Palpation:tenderness paravertebral muscles, tenderness trapezium, left, right and positive. Range of motion:reduced flexion, extension, rotation bilaterally and is painful. facet tenderness +  Spurling's: negative bilaterally     Thoracic spine:                Spine inspection:normal   Palpation:No tenderness over the midline and paraspinal area, bilaterally  Range of motion:normal in flexion, extension rotation bilateral and is not painful. Lumbar spine:     Spine inspection: Normal   Palpation: Tenderness paravertebral muscles Yes bilaterally  Range of motion: Decreased, flexion Decreased,  Sacroiliac joint tenderness No bilaterally  SLR : negative bilaterally     Musculoskeletal:     Trigger points no     Extremities:     Tremors:None bilaterally upper and lower  Edema:no     Neurological:     Sensory: Normal to light touch      Motor:   Right  5/5              Left  5/5               Right Bicep 5/5           Left Bicep 5/5              Right Triceps 5/5       Left Triceps 5/5          Right Deltoid 5/5     Left Deltoid 5/5                  Right Quadriceps 5/5          Left Quadriceps 5/5           Right Gastrocnemius 5/5    Left Gastrocnemius 5/5  Right Ant Tibialis 5/5  Left Ant Tibialis 5/5     Reflexes:    B/l equal     Dermatology:     Skin:no rashes or lesions noted    Assessment/Plan:       Diagnosis Orders   1. Hx of fusion of cervical spine          2. Cervical radicular pain          3. DDD (degenerative disc disease), cervical          4. Cervical spondylosis          5. Cervicalgia              64 y.o. female with h/o prior C5-7 [facility]. Did very well until recently- has noticed on and off neck pain and intermittent left UE pain. Failed conservative treatment. MRI of C spine reviewed. Also has bl CTS. Has low back pain issue- getting chiro treatment. S/P cervical RAFAEL C7-T1 which has helped the intense pain. But continues to have neck/ left shoulder blade and left UE pain. Significant facet tenderness + left side. Plan: Will do trigger point injection over the left cervical paraspinal, trapezius and thoracic paraspinal muscles-today. Relafen     Zanaflex     Offered trial of Neurontin- patient not too keen on meds. Consider cervical facet MBNB in future if axial neck pain persists.  Can call for cervical facet MBNB Recommend EMG/ NCS to eval CTS. Wants to revisit this later. Counseling : Patient encouraged to stay active and to continue Regular home exercise program as tolerated - stretching / strengthening. Treatment plan discussed with the patient including medication and procedure side effects. Controlled Substances Monitoring: [place]AR[place] reviewed. Keyla Luis MD    2022    Patient: Fady Coil  :  1960  Age:  64 y.o. Sex:  female     PRE-PROCEDURE DIAGNOSIS: Myofascial pain. POST-PROCEDURE DIAGNOSIS: Same. PROCEDURE:  left cervical paraspinal, trapezius and thoracic paraspinal muscles trigger point injections. SURGEON:   Keyla Luis MD    ANESTHESIA: local    ESTIMATED BLOOD LOSS:  None.  ______________________________________________________________________    BRIEF HISTORY:  After discussing the potential risks and benefits of the procedure with the patient. Vianca Liao did request that we proceed. A complete History & Physical was reviewed and it is unchanged. DESCRIPTION OF PROCEDURE:   Each trigger points were marked with patient input and prepped with chloraprep and draped, a #25-gauge, 1.5-inch needle was passed into the area of greatest tenderness. A total of 7 trigger point injections were performed. Each trigger point  received 1 cc of 0.25% Marcaine and a total of 30 mg DepoMedrol after negative aspiration of blood, air or paresthesia. The needle was removed intact and Band-Aid was applied. Disposition the patient tolerated the procedure well and there were no complications . Vianca Liao will follow up in our [facility] as scheduled. She was encouraged to call with questions, concerns or if worsening of symptoms occurs.     Keyla Luis MD

## 2022-09-21 NOTE — PROGRESS NOTES
Do you currently have any of the following:    Fever: No  Headache:  No  Cough: No  Shortness of breath: No  Exposed to anyone with these symptoms: No         Rodrigue Cough [facility] to the [facility] on 9/21/2022. Ishmael Sterling is complaining of pain in her neck. The pain is constant. The pain is described as aching, dull, sharp, and miserable. Pain is rated on her best day at a 3, on her worst day at a 8, and on average at a 6 on the VAS scale. She took her last dose of  aleve  yesterday. Any procedures since your last visit: Yes, with 25 % relief. Pacemaker or defibrillator: No     She is  on NSAIDS and is not on anticoagulation medications. Medication Contract and Consent for Opioid Use Documents Filed        No documents found                    BP (!) 153/86   Pulse 73   Temp 97 °F (36.1 °C) (Infrared)   Resp 16   Ht 5' 6\" (1.676 m)   Wt 157 lb (71.2 kg)   SpO2 98%   BMI 25.34 kg/m²      No LMP recorded. Patient has had a hysterectomy.

## 2023-01-20 RX ORDER — LEVOTHYROXINE SODIUM 137 UG/1
TABLET ORAL
Qty: 90 TABLET | Refills: 3 | Status: SHIPPED | OUTPATIENT
Start: 2023-01-20

## 2023-01-24 RX ORDER — LEVOTHYROXINE SODIUM 137 UG/1
137 TABLET ORAL DAILY
Qty: 90 TABLET | Refills: 3 | Status: SHIPPED | OUTPATIENT
Start: 2023-01-24

## (undated) DEVICE — 12 ML SYRINGE,LUER-LOCK TIP: Brand: MONOJECT

## (undated) DEVICE — GLOVE ORANGE PI 7 1/2   MSG9075

## (undated) DEVICE — GAUZE,SPONGE,4"X4",12PLY,STERILE,LF,2'S: Brand: MEDLINE

## (undated) DEVICE — Z DISCONTINUED APPLICATOR SURG PREP 0.35OZ 2% CHG 70% ISO ALC W/ HI LT

## (undated) DEVICE — NEEDLE HYPO 25GA L1.5IN BLU POLYPR HUB S STL REG BVL STR

## (undated) DEVICE — NEEDLE HYPO 18GA L1.5IN PNK POLYPR HUB S STL THN WALL FILL

## (undated) DEVICE — BANDAGE ADH W1XL3IN NAT FAB WVN FLX DURABLE N ADH PD SEAL

## (undated) DEVICE — SYRINGE, LUER LOCK, 5ML: Brand: MEDLINE

## (undated) DEVICE — Device: Brand: PORTEX

## (undated) DEVICE — 3M™ RED DOT™ MONITORING ELECTRODE WITH FOAM TAPE AND STICKY GEL 2560, 50/BAG, 20/CASE, 72/PLT: Brand: RED DOT™

## (undated) DEVICE — NON-DEHP CATHETER EXTENSION SET, MALE LUER LOCK ADAPTER

## (undated) DEVICE — 6 ML SYRINGE LUER-LOCK TIP: Brand: MONOJECT